# Patient Record
Sex: FEMALE | Race: WHITE | Employment: FULL TIME | URBAN - METROPOLITAN AREA
[De-identification: names, ages, dates, MRNs, and addresses within clinical notes are randomized per-mention and may not be internally consistent; named-entity substitution may affect disease eponyms.]

---

## 2017-09-26 NOTE — PROGRESS NOTES
HPI:   Toña Renteria is a 33year old female who presents for a complete physical exam.   Patient's last menstrual period was 2017 (approximate). Last pap 2 years ago and nl. Child almost 2 years ago - . No DM or HTN. Normal menses.  No bir clear  EYES:PERRLA, EOMI,,conjunctiva are clear  NECK: supple,no adenopathy,no bruits  CHEST: no chest tenderness  BREAST: no dominant or suspicious mass, nontender  LUNGS: clear to auscultation  CARDIO: RRR without murmur  GI: good BS's,no masses, HSM or

## 2019-02-06 NOTE — TELEPHONE ENCOUNTER
Pt requesting RX for Plan B- stated her INS will cover it and it will not cost her, she stated she know it's also OTC-  LOV 2017

## 2019-02-06 NOTE — TELEPHONE ENCOUNTER
I sent it. Please transfer her to call center to schedule physical. Need to see her yearly in order to be prescribing medications.

## 2019-02-06 NOTE — TELEPHONE ENCOUNTER
Pt was called and inform of  message below. Pt verbalized understanding and was transferred to set up a Px appt.

## 2019-03-19 NOTE — PROGRESS NOTES
HPI:   Jo Madrigal is a 33year old female who presents for a complete physical exam.    Here for regular physical. Reports no concerns with her health. Has been exercising. Regular menses monthly.    Stopped the birth controlled   Wt Readings from L BS's,no masses, HSM or tenderness  MUSCULOSKELETAL: back is not tender,FROM of the back  EXTREMITIES: no cyanosis, clubbing or edema  NEURO: Oriented times three,cranial nerves are intact,motor and sensory are grossly intact    ASSESSMENT AND PLAN:   Addie Clayton

## 2019-12-12 NOTE — TELEPHONE ENCOUNTER
Pt had a positive preg test at home. LMP 11-6-19. Pt agrees to see all MDs. Pt will start a pnv with dha. Pt to call if any problems or spotting/bleeding. Pt given the date, time and location for the appt for obn.   Pt states that her periods are regul

## 2019-12-26 NOTE — PROGRESS NOTES
Pt seen for OBN appt today with no complaints. Normal PN labs and HCV ordered. Pt advised all labs must be completed and resulted prior to MD appt.   Assisted pt with scheduling NPN appt with ANNA on 1/11/20 at 830am.    Advised pt to obtain C-Sx operative Neural tube defect (Meningomyelocele, Spina bifida, or Anencephaly):  No   Congenital heart defect:  No   Down syndrome:  No   Avery-Sachs (Ashkenazi Zoroastrian, [facility], [place]):  No   Canavan disease (Ashkenazi Zoroastrian):  No   Familial dysautonomia (As

## 2020-01-11 PROBLEM — Z98.891 HISTORY OF C-SECTION: Status: ACTIVE | Noted: 2020-01-11

## 2020-01-11 NOTE — PROGRESS NOTES
Here with . Pap/Gc/chlamydia/trichomonas. Considering FTS. Reviewed NPN labs. Pt having increased discharge with odor. Rx Flagyl since urine culture-- gardnerella . Uncertain if she already got flu shot. Bedside u/s--+FHT, c/w dates.   RTC

## 2020-01-25 NOTE — TELEPHONE ENCOUNTER
11w3d states Finished abx for foul smell vaginal d/c and started having vaginal itching and burning. Denies pain or burning with urination. Denies frequent urination. Denies backache or fevers. Denies spotting or bleeding.  Advised ok to try Monistat #3 or

## 2020-01-25 NOTE — TELEPHONE ENCOUNTER
LEFT MESSAGE TO USE 7 DAY MONISTAT RATHER THAN 3 DAY. ENCOURAGED TO CALL BACK WITH ANY QUESTIONS OR CONCERNS.

## 2020-02-06 NOTE — PROGRESS NOTES
[facility] at visit. They have Op Note but forgot it at home. I discussed and gave  packet for their review. They never did call back for FTS but are interested. We'll see if possible by .

## 2020-02-06 NOTE — TELEPHONE ENCOUNTER
It is very late but couple still interested in Devyn Jernigan. I saw them last evening and see if we can refer them by 13 6/7. I told them it may not be possible. Thank you.

## 2020-02-12 NOTE — PROGRESS NOTES
OB ULTRASOUND REPORT   See imaging tab for complete ultrasound report     Fetal Heart Rate: Present 150 bpm  Fetal Presentation: Transverse Left  Amniotic fluid MVP: WNL  Placental Location: Anterior      FIRST TRIMESTER SCREENING:    The CRL is cons

## 2020-02-13 NOTE — TELEPHONE ENCOUNTER
Recd FTS results and Dr Umaña Ahr  reviewed and signed off  Spoke with Kana Whitfield and informed her that the probability for Trisomy 13,18  after screening is 1 in > 10,000 and for Trisomy 21 1 in > 10,000.   These results within range  Pt verbalized Reading

## 2020-03-05 NOTE — PROGRESS NOTES
DARIANA asked for a copy of pt's op report dated 10-27-15 that pt brought into the office. Placed on NJ's desk.

## 2020-03-05 NOTE — PROGRESS NOTES
Flu vaccine given in left deltoid. Pt tolerated well. Pt signed consent and given information sheet.       LOT  [id number]  EXP  06-30-20  . [facility]  [phone]

## 2020-03-10 NOTE — PROGRESS NOTES
Wishes for Flu shot. Documented primary LSTCSx via care everywhere. Needs to review  consent.  RTC 4 wks

## 2020-03-23 NOTE — TELEPHONE ENCOUNTER
19w5d Pt requesting letter stating she needs to work from home for employer. Informed pt our MDs are following recommendations from ACOG and CDC. Informed pt we can provide generic letter regarding Covid-19.  Pt requesting message to be sent to CAP (on-call

## 2020-03-23 NOTE — TELEPHONE ENCOUNTER
Pt is 19w5d and looking for some feedback in regards to [address], per pt is still required to work.  Please advise

## 2020-03-24 NOTE — TELEPHONE ENCOUNTER
Generic letter is ok with me. Agree with triage that there are no current recommendations mandating that pregnant patients stay home from work.

## 2020-03-26 PROBLEM — Z13.79 GENETIC SCREENING: Status: ACTIVE | Noted: 2020-03-26

## 2020-04-01 NOTE — TELEPHONE ENCOUNTER
Pt states she thought her appt on 4/4/ was going to be the appt that she found out the gender. Pt informed that the gender can be found out at her 23 wk US if the baby is positioned properly. Pt states she was never given an order for the 20 wk US.   Her

## 2020-04-01 NOTE — TELEPHONE ENCOUNTER
Pt was rescheduled frm 4-4-20 to 4-17-20 PC with Dr Kenneth Montero per Dr Kenneth Montero    w-like to get an order to find out sex of baby?     Please advise

## 2020-04-17 PROBLEM — Z34.90 NORMAL OBSTETRIC ULTRASOUND SCAN, ANTEPARTUM: Status: ACTIVE | Noted: 2020-04-17

## 2020-04-17 PROBLEM — Z34.90 NORMAL OBSTETRIC ULTRASOUND SCAN, ANTEPARTUM (HCC): Status: ACTIVE | Noted: 2020-04-17

## 2020-05-13 NOTE — TELEPHONE ENCOUNTER
Patient would like to be seen by MATEUS at [place] on 5/21 in the afternoon. CAP is at [place] that afternoon. Please reschedule with her for this as I will no longer be seeing OB pt in the office on 5/21 in afternoon.

## 2020-07-24 NOTE — TELEPHONE ENCOUNTER
Pt requesting letter stating she is pregnant and her RACHELE. Informed pt letter will be sent via [address] Pt verbalized understanding.

## 2020-07-29 NOTE — TELEPHONE ENCOUNTER
Spoke to pt. Advised repeat section scheduled on Wed,8/5/20 at 7:30am. Verbalized understandings. Pt advised pre-op labs to be done in [facility] system. Instructions FBC info sheet and antimicrobial wash instructions sent via [employer].     Will call A

## 2020-07-29 NOTE — TELEPHONE ENCOUNTER
Please schedule the following surgery:    Procedure: Repeat  section    Date: 39 weeks --  am    Diagnosis: Previous  section, Breech    Admission: AM Admit    Anesth: Spinal

## 2020-07-31 NOTE — TELEPHONE ENCOUNTER
Pt states she is scheduled for her  on . She has the covid test scheduled but was unsure when to do the blood work. Pt instructed to have blood work done 2 days prior to surgery. Pt also states [facility] told her she can have a tour of [address] [place] [address].   Pt as

## 2020-08-03 ENCOUNTER — TELEPHONE (OUTPATIENT)
Dept: OBGYN UNIT | Facility: HOSPITAL | Age: 30
End: 2020-08-03

## 2020-08-03 NOTE — TELEPHONE ENCOUNTER
PT DROPPED OFF FORMS TO BE FILLED OUT FOR FMLA. PT FILLED OUT HIPAA AND PAID $25 FEE IN FULL ON 8.3.2020 AT [facility] [place].

## 2020-08-03 NOTE — TELEPHONE ENCOUNTER
38w5d Pt asking if she needs to fast prior to labs. Advised pt she does not need to fast and they need to be done 72 hrs prior to scheduled c-sx. Pt verbalized understanding.

## 2020-08-04 NOTE — TELEPHONE ENCOUNTER
Dr. Rakan Mcmillan,     Please sign off on form: FMLA - maternity leave   -Highlight the patient and hit \"Chart\" button. -In Chart Review, w/in the Encounter tab - click 1 time on the Telephone call encounter for 8/3/2020 Scroll down the telephone encounter.

## 2020-08-04 NOTE — H&P
[facility] [facility] [address] Patient Status:  Surgery Admit - Inpt    1990 MRN [id number]   Location [address], [address] [facility] Day # 0 PCP Aassoheila 46 Meds: prenatal multivitamin plus DHA 27-0.8-228 MG Oral Cap, Take 1 capsule by mouth daily. , Disp: , Rfl: , 8/4/2020 at 2100  MECLIZINE HCL OR, Take by mouth as needed. , Disp: , Rfl: , More than a month at Unknown time      Allergies: No Known Allergies 0.00 - 0.70 x10(3) uL    Basophil Absolute 0.08 0.00 - 0.20 x10(3) uL    Immature Granulocyte Absolute 0.19 0.00 - 1.00 x10(3) uL    Neutrophil % 65.2 %    Lymphocyte % 21.4 %    Monocyte % 8.9 %    Eosinophil % 1.6 %    Basophil % 0.9 %    Immature Granul

## 2020-08-05 NOTE — PROGRESS NOTES
Patient transferred to mother/baby room 355   per cart in stable condition with baby and personal belongings. Accompanied by  and staff. Report given to mother/baby RN.

## 2020-08-05 NOTE — PROGRESS NOTES
Tasneem-care done and patient sitting in the chair with minimal complaints. Patient advanced to a full liquid diet tray. All questions answered.

## 2020-08-05 NOTE — OPERATIVE REPORT
Operative Report for  Section:    Date: 20  Patient: Kevin Morrow  : 1990  MRN: [id number]    Preoperative Diagnosis: Intrauterine Pregnancy 39w0d, previous CS, Breech presentation  Postoperative Diagnosis: same  Procedure(s): rep in the midline and the incision extended laterally with the dash scissors. The superior aspect of the fascial incision was then grasped with Kocher clamps, elevated, and the underlying rectus muscles dissected off.   Attention was then turned to the inferi procedure well. Sponge, lap, and needle counts were correct. Two grams of Ancef were given prior to skin incision. The patient was taken to the recovery room in stable condition.     Eagle Estes, 08/05/20, 9:02 AM

## 2020-08-05 NOTE — ANESTHESIA PROCEDURE NOTES
Spinal Block  Date/Time: 8/5/2020 7:45 AM  Performed by: Whit Patterson MD  Authorized by: Whit Patterson MD       General Information and Staff    Start Time:  8/5/2020 7:39 AM  End Time:  8/5/2020 7:46 AM  Anesthesiologist:  Analisa Craig

## 2020-08-05 NOTE — DISCHARGE SUMMARY
[facility]    Discharge Summary    Karlo Estrada Patient Status:  Inpatient    1990 MRN [id number]   Location [address] [facility] Attending Lashon Mike, [address] [facility] Day # 0       Delivering OB Clinician:

## 2020-08-05 NOTE — ANESTHESIA PREPROCEDURE EVALUATION
Anesthesia PreOp Note    HPI:     Jossy Shook is a 30year old female who presents for preoperative consultation requested by: Sandeep Fan DO    Date of Surgery: 2020    Procedure(s):   SECTION  Indication: benjie 20  repeat c-se History    Socioeconomic History      Marital status: Single      Spouse name: Not on file      Number of children: Not on file      Years of education: Not on file      Highest education level: Not on file    Occupational History      Not on file    Joanna BP: 113/72   Pulse: 72   Resp: 16   Temp: 97.6 °F (36.4 °C)   TempSrc: Oral        Anesthesia Evaluation     Patient summary reviewed    Airway   Mallampati: II  TM distance: >3 FB  Dental - normal exam     Pulmonary - negative ROS and normal exam   Card

## 2020-08-05 NOTE — PROGRESS NOTES
Pt is a 30year old female admitted to TR5/TR5-A. No chief complaint on file. Pt is  39w0d intra-uterine pregnancy. History obtained, consents signed. Oriented to room, staff, and plan of care.

## 2020-08-05 NOTE — PROGRESS NOTES
Received patient from L&D via wheelchair. ID bands verified and unit orientation discussed and plan of care agreed on. Vitals are stable and bleeding WNL. Patient is breastfeeding and bottle feeding. All questions answered. Family at the bedside.

## 2020-08-05 NOTE — ANESTHESIA POSTPROCEDURE EVALUATION
Patient: Keri Alejo    Procedure Summary     Date:  20 Room / Location:  [facility] L+D OR  [facility] L+D OR    Anesthesia Start:  8751 Anesthesia Stop:      Procedure:   SECTION (N/A ) Diagnosis:  (Repeat Cesearen Section)    Surgeon:  Tigist Varghese

## 2020-08-06 NOTE — PROGRESS NOTES
[facility]    OB/Gyne Post  Section Progress Note      Fidelia Yomahendra Patient Status:  Inpatient    1990 MRN [id number]   Location [facility] 3SE Attending Sarah Daily, [address] [facility] Day # 1 PCP Yuridia Ruvalcaba found.      Assessment/Plan   POD# 1 with following issues: Patient Active Problem List:     History of      Genetic screening     Normal obstetric ultrasound scan, antepartum     Breech presentation  .     CPM, ambulate      Donavan Alcaraz MD  8/

## 2020-08-06 NOTE — LACTATION NOTE
LACTATION NOTE - MOTHER      Evaluation Type: Inpatient    Problems identified  Problems identified: Knowledge deficit  Milk supply not WNL: Reduced (potential)  Problems Identified Other: supplementing with formula    Maternal history  Maternal history: C

## 2020-08-06 NOTE — LACTATION NOTE
This note was copied from a baby's chart. LACTATION NOTE - INFANT    Evaluation Type  Evaluation Type: Inpatient    Problems & Assessment  Problems Diagnosed or Identified: Sleepy; Latch difficulty  Problems: comment/detail: breech  Infant Assessment: Skin

## 2020-08-07 NOTE — PROGRESS NOTES
[facility]    Post  Progress Note      Bairon Bettencourt Patient Status:  Inpatient    1990 MRN [id number]   Location [facility] 3SE Attending Partha Go, DO   Hosp Day # 2 PCP Percy CHIANG       Subjective

## 2020-08-08 NOTE — TELEPHONE ENCOUNTER
Pt had C-sec on 8/5 and reports worsening of rash. States rash started the day after her C-sec and has gotten worse this morning. States rash is red with bumps and itching to inner thighs, abdomen and hips. Denies signs of infection to incision.  Also denie

## 2020-08-08 NOTE — TELEPHONE ENCOUNTER
Patient has a rash on stomach,theighs, and inner hip area, patient believes it may be an allergic reaction to what was given to her during , please call at:[phone],thanks.

## 2020-08-10 NOTE — TELEPHONE ENCOUNTER
Pt. states that rash has spread all over her body, and that the Benadryl has not helped with her symptoms.

## 2020-08-10 NOTE — TELEPHONE ENCOUNTER
Informed pt of CHANOK recs below. Offered pt first available appt with NIKHIL on 8/11/2020 in [facility] and pt declined. Assisted pt with scheduling appt with ANNA at [facility] on 8/11/2020 at 420pm. Pt verbalized understanding.

## 2020-08-10 NOTE — TELEPHONE ENCOUNTER
C-SX ON 8-5-20. C/O RASH SPREAD OVER ARMS YESTERDAY SO IS NOW ALL OVER HER BODY. HAS USED HYDROCORTISONE/BENADRYL CREAM 4-5/DAY AND HAS TAKEN ORAL BENADRYL 4 TIMES SINCE Saturday. SHE CALLED PCP AND WAS TRANSFERRED BACK TO US. PT IS BREASTFEEDING.

## 2020-08-12 NOTE — PROGRESS NOTES
Zoya Ibrahim is a 30year old female [id number] Patient's last menstrual period was 2019 (exact date). Patient presents with:  Prenatal Care: 1 WK PP C/O RASH ALL OVER BODY    Had repeat  on 2020 with LIZY. Went home on POD #2.   At time kg/m²   Body mass index is 29.04 kg/m². Constitutional: well developed, well nourished  Abdomen-- soft and nontender. Incision intact without any erythema or rash.    Skin-- raised scattered rash on bilateral lower extremities and now bilateral arms

## 2020-09-18 PROBLEM — Z34.90 NORMAL OBSTETRIC ULTRASOUND SCAN, ANTEPARTUM: Status: RESOLVED | Noted: 2020-04-17 | Resolved: 2020-09-18

## 2020-09-18 PROBLEM — Z34.90 NORMAL OBSTETRIC ULTRASOUND SCAN, ANTEPARTUM (HCC): Status: RESOLVED | Noted: 2020-04-17 | Resolved: 2020-09-18

## 2020-09-18 PROBLEM — Z13.79 GENETIC SCREENING: Status: RESOLVED | Noted: 2020-03-26 | Resolved: 2020-09-18

## 2020-09-18 PROBLEM — Z98.891 HISTORY OF C-SECTION: Status: RESOLVED | Noted: 2020-01-11 | Resolved: 2020-09-18

## 2020-09-18 NOTE — H&P
MENG    Luis Alexandre is a 30year old female  here for 6 week post-partum visit. Patient delivered a  female infant on 20 via repeat CSx. Patient desires condoms for contraception. Patient is breast feeding.    Patient denies symptoms of de multivitamin plus DHA 27-0.8-228 MG Oral Cap, Take 1 capsule by mouth daily. , Disp: , Rfl:     ALLERGIES:  No Known Allergies    PHYSICAL EXAM  Blood pressure 119/85, pulse 72, weight 143 lb (64.9 kg), last menstrual period 11/06/2019, currently breastfeed

## 2021-01-15 NOTE — H&P
HPI:  The patient is a 36 yo F here for WWE. NO gyn complaints. Monthly cycles with 4 days light to mod flow. Monogamous. Uses condoms. Wants to discuss [facility] options.       LPS:1/11/20 pap neg    Reviewed medical and surgical history below       OBSTET on file        Emotionally abused: Not on file        Physically abused: Not on file        Forced sexual activity: Not on file    Other Topics      Concerns:        Not on file    Social History Narrative      Not on file      FAMILY HISTORY:  No family h contour, position, mobility, without tenderness  Adnexa: normal without masses or tenderness  Perineum: normal    Assessment/Plan:  Marilu Morris was seen today for gyn exam.    Diagnoses and all orders for this visit:    Well woman exam    Birth control couns

## 2023-09-27 ENCOUNTER — OFFICE VISIT (OUTPATIENT)
Dept: FAMILY MEDICINE CLINIC | Facility: CLINIC | Age: 33
End: 2023-09-27

## 2023-09-27 ENCOUNTER — LAB ENCOUNTER (OUTPATIENT)
Dept: LAB | Age: 33
End: 2023-09-27
Attending: NURSE PRACTITIONER
Payer: COMMERCIAL

## 2023-09-27 VITALS
BODY MASS INDEX: 27.88 KG/M2 | WEIGHT: 142 LBS | SYSTOLIC BLOOD PRESSURE: 108 MMHG | HEART RATE: 62 BPM | DIASTOLIC BLOOD PRESSURE: 72 MMHG | HEIGHT: 60 IN

## 2023-09-27 DIAGNOSIS — L30.9 DERMATITIS: Primary | ICD-10-CM

## 2023-09-27 DIAGNOSIS — L30.9 DERMATITIS: ICD-10-CM

## 2023-09-27 PROCEDURE — 86003 ALLG SPEC IGE CRUDE XTRC EA: CPT

## 2023-09-27 PROCEDURE — 36415 COLL VENOUS BLD VENIPUNCTURE: CPT

## 2023-09-27 PROCEDURE — 82785 ASSAY OF IGE: CPT

## 2023-09-27 PROCEDURE — 3074F SYST BP LT 130 MM HG: CPT | Performed by: NURSE PRACTITIONER

## 2023-09-27 PROCEDURE — 3078F DIAST BP <80 MM HG: CPT | Performed by: NURSE PRACTITIONER

## 2023-09-27 PROCEDURE — 99213 OFFICE O/P EST LOW 20 MIN: CPT | Performed by: NURSE PRACTITIONER

## 2023-09-27 PROCEDURE — 3008F BODY MASS INDEX DOCD: CPT | Performed by: NURSE PRACTITIONER

## 2023-09-27 RX ORDER — CLOBETASOL PROPIONATE 0.5 MG/G
1 OINTMENT TOPICAL 2 TIMES DAILY PRN
Qty: 45 G | Refills: 1 | Status: SHIPPED | OUTPATIENT
Start: 2023-09-27

## 2023-09-28 LAB
A ALTERNATA IGE QN: <0.1 KUA/L (ref ?–0.1)
C HERBARUM IGE QN: <0.1 KUA/L (ref ?–0.1)
CAT DANDER IGE QN: 1.51 KUA/L (ref ?–0.1)
CLAM IGE QN: <0.1 KUA/L (ref ?–0.1)
CODFISH IGE QN: <0.1 KUA/L (ref ?–0.1)
COMMON RAGWEED IGE QN: 3.99 KUA/L (ref ?–0.1)
CORN IGE QN: <0.1 KUA/L (ref ?–0.1)
COW MILK IGE QN: <0.1 KUA/L (ref ?–0.1)
D FARINAE IGE QN: <0.1 KUA/L (ref ?–0.1)
DOG DANDER IGE QN: 7.49 KUA/L (ref ?–0.1)
EGG WHITE IGE QN: <0.1 KUA/L (ref ?–0.1)
GOOSEFOOT IGE QN: 0.16 KUA/L (ref ?–0.1)
HOUSE DUST HS IGE QN: 0.4 KUA/L (ref ?–0.1)
IGE SERPL-ACNC: 57.3 KU/L (ref 2–214)
IGE SERPL-ACNC: 70.8 KU/L (ref 2–214)
KENT BLUE GRASS IGE QN: 0.1 KUA/L (ref ?–0.1)
PEANUT IGE QN: <0.1 KUA/L (ref ?–0.1)
PER RYE GRASS IGE QN: <0.1 KUA/L (ref ?–0.1)
SCALLOP IGE QN: <0.1 KUA/L (ref ?–0.1)
SESAME SEED IGE QN: <0.1 KUA/L (ref ?–0.1)
SHRIMP IGE QN: <0.1 KUA/L (ref ?–0.1)
SOYBEAN IGE QN: <0.1 KUA/L (ref ?–0.1)
WALNUT IGE QN: <0.1 KUA/L (ref ?–0.1)
WHEAT IGE QN: <0.1 KUA/L (ref ?–0.1)
WHITE ELM IGE QN: 0.18 KUA/L (ref ?–0.1)
WHITE OAK IGE QN: 0.91 KUA/L (ref ?–0.1)

## 2023-09-29 ENCOUNTER — TELEPHONE (OUTPATIENT)
Dept: FAMILY MEDICINE CLINIC | Facility: CLINIC | Age: 33
End: 2023-09-29

## 2023-09-29 DIAGNOSIS — T78.40XA ALLERGY, INITIAL ENCOUNTER: Primary | ICD-10-CM

## 2023-09-29 DIAGNOSIS — L30.9 DERMATITIS: ICD-10-CM

## 2023-09-29 NOTE — TELEPHONE ENCOUNTER
Called Kina, verified patient's Name and . Confirmed that prescription was received, however, it is under the patient's profile showing her maiden name and not her   name Cleopatra. Spoke to patient, verified patient's Name and . Relayed information above. She states she told them both last names when she tried to  the medication. She will bring her insurance card and will have her name updated at the pharmacy when she picks up the medication. Patient verbalized understanding and had no further questions at this time.

## 2023-09-29 NOTE — TELEPHONE ENCOUNTER
Patient called and said prescription was not sent to pharmacy.        Medication Quantity Refills Start End   clobetasol 0.05 % External Ointment

## 2023-10-26 ENCOUNTER — OFFICE VISIT (OUTPATIENT)
Dept: DERMATOLOGY CLINIC | Facility: CLINIC | Age: 33
End: 2023-10-26

## 2023-10-26 DIAGNOSIS — L30.9 DERMATITIS: Primary | ICD-10-CM

## 2023-10-26 PROCEDURE — 99203 OFFICE O/P NEW LOW 30 MIN: CPT | Performed by: PHYSICIAN ASSISTANT

## 2023-10-26 NOTE — PROGRESS NOTES
HPI:    Patient ID: Manny Lopez is a 29year old female. Patient presents for rash of concern on the right lower leg for the past 1 year and newly on the anterior neck. Has been wearing sweaters lately as well. Did have allergy tesitng done, which showed allergies, to cats, dogs, and ragweed. No draining or tenderness noted. Some itching noted. Has noted more sensitivity to things after her 2nd pregnancy. Has been using triamcinolone and clobetasol with some relief. Rash on the shin is mostly gone. Denies changes to products or any changes to the enviornement lately. No allergies to medications noted. Patient does have a dog at home. Review of Systems   Constitutional:  Negative for chills and fever. Musculoskeletal:  Negative for arthralgias and myalgias. Skin:  Positive for rash. Negative for color change and wound. Current Outpatient Medications   Medication Sig Dispense Refill    clobetasol 0.05 % External Ointment Apply 1 Application topically 2 (two) times daily as needed. 45 g 1    MECLIZINE HCL OR Take by mouth.      triamcinolone 0.1 % External Ointment Apply 1 Application topically 2 (two) times daily. Apply to affected area twice a day 30 g 2     Allergies:No Known Allergies   LMP 05/23/2023 (Exact Date)   There is no height or weight on file to calculate BMI. PHYSICAL EXAM:   Physical Exam  Constitutional:       General: She is not in acute distress. Appearance: Normal appearance. Skin:     General: Skin is warm and dry. Findings: Rash present. Comments: Eczematous rash noted on the anterior neck. No draining or tenderness noted. Slight scaling ntoed. Hyperpigmentation noted on the lower right leg. No draining or tenderness noted. Neurological:      Mental Status: She is alert and oriented to person, place, and time. ASSESSMENT/PLAN:   1.  Dermatitis  -After discussion with patient, advised the following:  -Suspect allergies are contributing to the rash. -Continue clobetasol the neck 2 times per day   -Continue clobetasol to the lower right leg once per day  -Should improve in the next few weeks   -To call or follow-up with worsening symptoms or concerns.   -Pt was agreeable to plan and will comply with discussion above. No orders of the defined types were placed in this encounter.       Meds This Visit:  Requested Prescriptions      No prescriptions requested or ordered in this encounter       Imaging & Referrals:  None         FB#[id number]

## 2023-11-30 ENCOUNTER — OFFICE VISIT (OUTPATIENT)
Dept: FAMILY MEDICINE CLINIC | Facility: CLINIC | Age: 33
End: 2023-11-30

## 2023-11-30 VITALS
HEART RATE: 63 BPM | WEIGHT: 144 LBS | SYSTOLIC BLOOD PRESSURE: 112 MMHG | BODY MASS INDEX: 28 KG/M2 | DIASTOLIC BLOOD PRESSURE: 78 MMHG

## 2023-11-30 DIAGNOSIS — M54.50 ACUTE BILATERAL LOW BACK PAIN WITHOUT SCIATICA: Primary | ICD-10-CM

## 2023-11-30 PROCEDURE — 99213 OFFICE O/P EST LOW 20 MIN: CPT | Performed by: FAMILY MEDICINE

## 2023-11-30 PROCEDURE — 3078F DIAST BP <80 MM HG: CPT | Performed by: FAMILY MEDICINE

## 2023-11-30 PROCEDURE — 3074F SYST BP LT 130 MM HG: CPT | Performed by: FAMILY MEDICINE

## 2024-03-20 ENCOUNTER — OFFICE VISIT (OUTPATIENT)
Dept: OBGYN CLINIC | Facility: CLINIC | Age: 34
End: 2024-03-20

## 2024-03-20 VITALS
BODY MASS INDEX: 28.27 KG/M2 | HEIGHT: 60 IN | SYSTOLIC BLOOD PRESSURE: 107 MMHG | WEIGHT: 144 LBS | HEART RATE: 76 BPM | DIASTOLIC BLOOD PRESSURE: 72 MMHG

## 2024-03-20 DIAGNOSIS — Z12.4 SCREENING FOR CERVICAL CANCER: ICD-10-CM

## 2024-03-20 DIAGNOSIS — Z01.419 WELL WOMAN EXAM WITH ROUTINE GYNECOLOGICAL EXAM: Primary | ICD-10-CM

## 2024-03-20 PROCEDURE — 99395 PREV VISIT EST AGE 18-39: CPT | Performed by: NURSE PRACTITIONER

## 2024-03-20 PROCEDURE — 3008F BODY MASS INDEX DOCD: CPT | Performed by: NURSE PRACTITIONER

## 2024-03-20 PROCEDURE — 3078F DIAST BP <80 MM HG: CPT | Performed by: NURSE PRACTITIONER

## 2024-03-20 PROCEDURE — 3074F SYST BP LT 130 MM HG: CPT | Performed by: NURSE PRACTITIONER

## 2024-03-20 NOTE — PROGRESS NOTES
[facility]    Obstetrics and Gynecology    Chief Complaint   Patient presents with    Gyn Exam     ANNUAL EXAM       Josefa Whelan is a 33 year old female  Patient's last menstrual period was 03/10/2024 (approximate). presenting for annual gynecology exam. Feeling well, no concerns today.  Regular cycles every month, lasts 5 days, normal to heavy flow day 1, no concerns with pain, some bloating with period.  She is sexually active with , no concerns with intercourse. Not using contraception, not trying for pregnancy but would be okay if conceived.    Moderate exercise.    Pap:2020 NILM   Contraception:none  Mammo: n/a  Colonoscopy: n/a    OBSTETRICS HISTORY:  OB History    Para Term  AB Living   2 2 2 0 0 2   SAB IAB Ectopic Multiple Live Births   0 0 0 0 2       GYNE HISTORY:  Menarche: 11/13 YEARS OLD (3/20/2024 10:36 AM)  Period Cycle (Days): monthly (3/20/2024 10:36 AM)  Period Duration (Days): 4 TO 5 DAYS (3/20/2024 10:36 AM)  Period Flow: HEAVY TO MODERATE (3/20/2024 10:36 AM)  Use of Birth Control (if yes, specify type): None (3/20/2024 10:36 AM)  Pap Date: 20 (3/20/2024 10:36 AM)  Pap Result Notes: NEG (3/20/2024 10:36 AM)  Follow Up Recommendation: annual 20 JLK (3/20/2024 10:36 AM)      History   Sexual Activity    Sexual activity: Not on file           Latest Ref Rng & Units 2020     9:23 AM 2017     5:10 PM   RECENT PAP RESULTS   Thinprep Pap Negative for intraepithelial lesion or malignancy Negative for intraepithelial lesion or malignancy  Negative for intraepithelial lesion or malignancy          MEDICAL HISTORY:  Past Medical History:   Diagnosis Date    Visual impairment     glasses     Past Surgical History:   Procedure Laterality Date      10/27/2015    last in        SOCIAL HISTORY:  Social History     Socioeconomic History    Marital status:      Spouse name: Not on file    Number of children: Not on file     Years of education: Not on file    Highest education level: Not on file   Occupational History    Not on file   Tobacco Use    Smoking status: Never    Smokeless tobacco: Never   Vaping Use    Vaping Use: Never used   Substance and Sexual Activity    Alcohol use: Yes     Comment: socially     Drug use: No    Sexual activity: Not on file   Other Topics Concern    Grew up on a farm Not Asked    History of tanning Not Asked    Outdoor occupation Not Asked    Breast feeding Not Asked    Reaction to local anesthetic No    Pt has a pacemaker Not Asked    Pt has a defibrillator Not Asked   Social History Narrative    Not on file     Social Determinants of Health     Financial Resource Strain: Not on file   Food Insecurity: Not on file   Transportation Needs: Not on file   Physical Activity: Not on file   Stress: Not on file   Social Connections: Not on file   Housing Stability: Not on file         Depression Screening (PHQ-2/PHQ-9): Over the LAST 2 WEEKS   Little interest or pleasure in doing things: Not at all    Feeling down, depressed, or hopeless: Not at all    PHQ-2 SCORE: 0           FAMILY HISTORY:  History reviewed. No pertinent family history.    MEDICATIONS:    Current Outpatient Medications:     diclofenac 50 MG Oral Tab EC, Take 1 tablet (50 mg total) by mouth 2 (two) times daily as needed (take it with food)., Disp: 30 tablet, Rfl: 0    ALLERGIES:  No Known Allergies      Review of Systems:  Constitutional:  Denies fatigue, night sweats, hot flashes  Eyes:  denies blurred or double vision  Cardiovascular:  denies chest pain or palpitations  Respiratory:  denies shortness of breath  Gastrointestinal:  denies heartburn, abdominal pain, diarrhea or constipation  Genitourinary:  denies dysuria, incontinence, abnormal vaginal discharge, vaginal itching,   Musculoskeletal:  denies back pain   Skin/Breast:  Denies any breast pain, lumps, or discharge.   Neurological:  denies headaches, extremity weakness or  numbness.  Psychiatric: denies depression or anxiety.  Endocrine:   denies excessive thirst or urination.  Heme/Lymph:  denies history of anemia, easy bruising or bleeding.      PHYSICAL EXAM:     Vitals:    03/20/24 1036   BP: 107/72   Pulse: 76   Weight: 144 lb (65.3 kg)   Height: 5' (1.524 m)       Body mass index is 28.12 kg/m².     Constitutional: well developed, well nourished  Psychiatric:  Oriented to time, place, person and situation. Appropriate mood and affect  Head/Face: normocephalic  Neck/Thyroid: thyroid symmetric, no thyromegaly, no nodules, no adenopathy  Lymphatic:no abnormal supraclavicular or axillary adenopathy is noted  Breast: normal without palpable masses, tenderness, asymmetry, nipple discharge, nipple retraction or skin changes  Abdomen:  soft, nontender, nondistended, no masses  Skin/Hair: no unusual rashes or bruises  Extremities: no edema, no cyanosis    Pelvic Exam:  External Genitalia: normal appearance, hair distribution, and no lesions  Urethral Meatus:  normal in size, location, without lesions and prolapse  Bladder:  No fullness, masses or tenderness  Vagina:  Normal appearance without lesions, no abnormal discharge  Cervix:  Normal without tenderness on motion  Uterus: normal in size, contour, position, mobility, without tenderness  Adnexa: normal without masses or tenderness  Perineum: normal  Anus: no hemorroids     Assessment & Plan:    ICD-10-CM    1. Well woman exam with routine gynecological exam  Z01.419       2. Screening for cervical cancer  Z12.4 ThinPrep PAP Smear     Hpv Dna  High Risk , Thin Prep Collect     ThinPrep PAP Smear     Hpv Dna  High Risk , Thin Prep Collect     CANCELED: ThinPrep PAP with HPV Reflex Request B         Reviewed ASCCP guidelines with the patient   Pap with HPV today  Contraception: Using none, declines  Breast Health:     Reviewed current guidelines with the patient and to start Mammograms at age 40  Reviewed monthly self breast exams with  the patient   Discussed diet, exercise, MVIs with Ca/Vit D  Follow up in 1 yr for JU Santiago    This note was prepared using Dragon Medical voice recognition dictation software. As a result errors may occur. When identified these errors have been corrected. While every attempt is made to correct errors during dictation discrepancies may still exist.     [FreeTextEntry2] : PO #2: LT Hip

## 2024-03-21 LAB — HPV I/H RISK 1 DNA SPEC QL NAA+PROBE: NEGATIVE

## 2024-11-01 NOTE — LETTER
VACCINE ADMINISTRATION RECORD  PARENT / GUARDIAN APPROVAL  Date: 2020  Vaccine administered to: Jo Madrigal     : 1990    MRN: [id number]    A copy of the appropriate Centers for Disease Control and Prevention Vaccine Information statement 50 year old male with a history of IDDM, HTN p/w left foot infection. Patient is poorly compliant with medication and treatment.  He was admitted 9/17-10/4 for infected foot ulcer, had a left first toe amputation on 9/20 and has not followed up with podiatry since.  He noted an infection with bad odor today.  On exam, deep space wound infection with erythema, odor, and wound dehiscence.  Check labs, lactate, X-ray, CT foot, abx, consult podiatry, admit for Abx and possible further resection 50 year old male with a history of IDDM, alcohol abuse p/w left foot infection. Patient is poorly compliant with medication and treatment.  He was admitted 9/17-10/4 for infected foot ulcer, had a left first toe amputation on 9/20 and has not followed up with podiatry since.  He noted an infection with bad odor today.  On exam, deep space wound infection with erythema, odor, and wound dehiscence.  Check labs, lactate, X-ray, CT foot, abx, consult podiatry, admit for Abx and possible further resection

## 2025-04-11 ENCOUNTER — OFFICE VISIT (OUTPATIENT)
Dept: FAMILY MEDICINE CLINIC | Facility: CLINIC | Age: 35
End: 2025-04-11

## 2025-04-11 ENCOUNTER — NURSE TRIAGE (OUTPATIENT)
Dept: FAMILY MEDICINE CLINIC | Facility: CLINIC | Age: 35
End: 2025-04-11

## 2025-04-11 VITALS
BODY MASS INDEX: 28.59 KG/M2 | OXYGEN SATURATION: 98 % | DIASTOLIC BLOOD PRESSURE: 71 MMHG | HEIGHT: 60 IN | HEART RATE: 66 BPM | SYSTOLIC BLOOD PRESSURE: 102 MMHG | WEIGHT: 145.63 LBS

## 2025-04-11 DIAGNOSIS — R59.0 SUBMANDIBULAR LYMPHADENOPATHY: Primary | ICD-10-CM

## 2025-04-11 DIAGNOSIS — K13.1 BITING OF ORAL MUCOSA: ICD-10-CM

## 2025-04-11 PROCEDURE — 3078F DIAST BP <80 MM HG: CPT

## 2025-04-11 PROCEDURE — 99213 OFFICE O/P EST LOW 20 MIN: CPT

## 2025-04-11 PROCEDURE — 3008F BODY MASS INDEX DOCD: CPT

## 2025-04-11 PROCEDURE — 3074F SYST BP LT 130 MM HG: CPT

## 2025-04-11 NOTE — TELEPHONE ENCOUNTER
Action Requested: Summary for Provider     []  Critical Lab, Recommendations Needed  [] Need Additional Advice  [x]   FYI    []   Need Orders  [] Need Medications Sent to Pharmacy  []  Other     SUMMARY: Patient stated that she had a shooting pain from her chin to the right side of her cheek yesterday. Noticed a lump about the size of a nickel or dime under the right side of her chin. Area is tender to touch. No cold symptoms or sore throat. Lymph nodes not swollen in the neck. No fevers. No numbness or tingling or facial drooping. Patient applied an ice pack yesterday to it. No other symptoms. Patient wanted to get it looked at today. Appointment made for today at 2pm with Braxton Lala in [place].   Reason for call: Lump (Lump under right side of chin)  Onset: Apr 10, 2025  Reason for Disposition   Swelling is painful to touch and no fever    Protocols used: Skin Lump or Localized Swelling-A-OH

## 2025-04-11 NOTE — PROGRESS NOTES
Subjective:   Josefa Whelan is a 34 year old female who presents for Lump ( Shooting pain under right side of chin, tender to touch,past 2 days,)   Patient is a pleasant 34-year-old female with no significant past medical history.  Patient presents to office today new to this provider for evaluation of painful lump.  Review of telephone encounter reveals    \"Patient stated that she had a shooting pain from her chin to the right side of her cheek yesterday. Noticed a lump about the size of a nickel or dime under the right side of her chin. Area is tender to touch. No cold symptoms or sore throat. Lymph nodes not swollen in the neck. No fevers. No numbness or tingling or facial drooping. Patient applied an ice pack yesterday to it. No other symptoms.\"    Patient states yesterday she had a muscle pain in her right face into cheek. She had some trouble turning her head as well. She felt some stiffness. NO fever, no chills. Had her teeth cleaned about 2 weeks ago. No sore throat no cough. She did bite the inner part of her mouth a few days ago.         Past Medical History[1]   Past Surgical History[2]     History/Other:    Chief Complaint Reviewed and Verified  Nursing Notes Reviewed and   Verified  Tobacco Reviewed  Allergies Reviewed  Medications Reviewed    Problem List Reviewed  Medical History Reviewed  Surgical History   Reviewed  OB Status Reviewed  Family History Reviewed  Social History   Reviewed         Tobacco:  She has never smoked tobacco.    Current Medications[3]      Review of Systems:  Review of Systems   Constitutional:  Negative for chills and fever.   Respiratory:  Negative for cough and shortness of breath.    Cardiovascular:  Negative for chest pain.   Hematological:  Positive for adenopathy.         Objective:   /71 (BP Location: Right arm, Patient Position: Sitting, Cuff Size: adult)   Pulse 66   Ht 5' (1.524 m)   Wt 145 lb 9.6 oz (66 kg)   LMP 03/29/2025   SpO2 98%    BMI 28.44 kg/m²  Estimated body mass index is 28.44 kg/m² as calculated from the following:    Height as of this encounter: 5' (1.524 m).    Weight as of this encounter: 145 lb 9.6 oz (66 kg).  Physical Exam  Vitals and nursing note reviewed.   Constitutional:       Appearance: Normal appearance.   HENT:      Head: Normocephalic and atraumatic.      Right Ear: Tympanic membrane, ear canal and external ear normal. There is no impacted cerumen.      Left Ear: Tympanic membrane, ear canal and external ear normal. There is no impacted cerumen.      Nose: Nose normal. No congestion or rhinorrhea.      Mouth/Throat:      Mouth: Mucous membranes are moist.      Pharynx: Oropharynx is clear. Posterior oropharyngeal erythema present. No oropharyngeal exudate.   Cardiovascular:      Rate and Rhythm: Normal rate and regular rhythm.      Pulses: Normal pulses.      Heart sounds: Normal heart sounds.   Lymphadenopathy:      Head:      Left side of head: Submandibular adenopathy present.   Skin:     Capillary Refill: Capillary refill takes less than 2 seconds.   Neurological:      Mental Status: She is alert and oriented to person, place, and time.           Assessment & Plan:   1. Submandibular lymphadenopathy (Primary)  2. Biting of oral mucosa  Other orders  -     Amoxicillin-Pot Clavulanate; Take 1 tablet by mouth 2 (two) times daily for 7 days.  Dispense: 14 tablet; Refill: 0    Lymphadenopathy likely 2/2 biting cheek vs dental cleaning  Advised warm compresses  Augmentin bid x7 days  If no resolution at 2 weeks check cbc and US    Patient aware of plan of care. All questions answered to satisfaction of the patient. Patient instructed to call office or reach out via [email or web address] if any issues arise. For urgent issues and/or reviewed red flags please proceed to the urgent care or ER.  Also, inform the nurse practitioner with any new symptoms or medication side effects.        Return for Annual physical.    JU Arrieta,  2025, 11:25 AM          [1]   Past Medical History:   Visual impairment    glasses   [2]   Past Surgical History:  Procedure Laterality Date      10/27/2015    last in    [3]   Current Outpatient Medications   Medication Sig Dispense Refill    amoxicillin clavulanate 875-125 MG Oral Tab Take 1 tablet by mouth 2 (two) times daily for 7 days. 14 tablet 0    diclofenac 50 MG Oral Tab EC Take 1 tablet (50 mg total) by mouth 2 (two) times daily as needed (take it with food). (Patient not taking: Reported on 2025) 30 tablet 0

## 2025-04-28 ENCOUNTER — OFFICE VISIT (OUTPATIENT)
Dept: FAMILY MEDICINE CLINIC | Facility: CLINIC | Age: 35
End: 2025-04-28

## 2025-04-28 ENCOUNTER — NURSE TRIAGE (OUTPATIENT)
Dept: FAMILY MEDICINE CLINIC | Facility: CLINIC | Age: 35
End: 2025-04-28

## 2025-04-28 VITALS
DIASTOLIC BLOOD PRESSURE: 76 MMHG | TEMPERATURE: 98 F | HEART RATE: 64 BPM | BODY MASS INDEX: 28.27 KG/M2 | WEIGHT: 144 LBS | HEIGHT: 60 IN | SYSTOLIC BLOOD PRESSURE: 120 MMHG

## 2025-04-28 DIAGNOSIS — R11.2 NAUSEA AND VOMITING, UNSPECIFIED VOMITING TYPE: ICD-10-CM

## 2025-04-28 DIAGNOSIS — H81.13 BENIGN PAROXYSMAL POSITIONAL VERTIGO DUE TO BILATERAL VESTIBULAR DISORDER: Primary | ICD-10-CM

## 2025-04-28 PROCEDURE — 3074F SYST BP LT 130 MM HG: CPT | Performed by: FAMILY MEDICINE

## 2025-04-28 PROCEDURE — 3008F BODY MASS INDEX DOCD: CPT | Performed by: FAMILY MEDICINE

## 2025-04-28 PROCEDURE — 99214 OFFICE O/P EST MOD 30 MIN: CPT | Performed by: FAMILY MEDICINE

## 2025-04-28 PROCEDURE — 3078F DIAST BP <80 MM HG: CPT | Performed by: FAMILY MEDICINE

## 2025-04-28 RX ORDER — MECLIZINE HCL 12.5 MG 12.5 MG/1
12.5 TABLET ORAL 3 TIMES DAILY PRN
Qty: 30 TABLET | Refills: 0 | Status: SHIPPED | OUTPATIENT
Start: 2025-04-28 | End: 2025-05-08

## 2025-04-28 RX ORDER — ONDANSETRON 8 MG/1
8 TABLET, FILM COATED ORAL EVERY 8 HOURS PRN
Qty: 20 TABLET | Refills: 0 | Status: SHIPPED | OUTPATIENT
Start: 2025-04-28 | End: 2025-05-05

## 2025-04-28 NOTE — PROGRESS NOTES
Patient ID: Josefa Whelan is a 34 year old female.         The following individual(s) verbally consented to be recorded using ambient AI listening technology and understand that they can each withdraw their consent to this listening technology at any point by asking the clinician to turn off or pause the recording:    Patient name: Josefa Whelan  Additional names:       Dana Honeycutt RN   Registered Nurse  Specialty: Registered Nurse     Telephone Encounter     Signed     Encounter Date: 4/28/2025     Signed         Action Requested: Summary for Provider      []  Critical Lab, Recommendations Needed  [] Need Additional Advice  []   FYI    []   Need Orders  [] Need Medications Sent to Pharmacy  []  Other      SUMMARY: states she has bene having very bad vertigo starting this morning. It is better now then it was when she woke up but she feels like keeping her eyes open for too long makes it worse. She has vomited from the dizziness when she keeps her eyes open for too long. Scheduled an appointment.      Reason for call: Dizziness (/)  Onset: this morning     The patient states she is having vertigo, she has had this before and takes meclizine for this but she is out of it now as she has not had it in some time. She said she is feeling very light headed and her vision is very \"shifty\" she said she has been vomiting as well from keeping her eyes open for too long while feeling dizzy. She is not having any chest pain or shortness of breath. She is able to walk and stand. She states it was much worse this morning then it is now. Scheduled an appointment for this morning.                    HPI      History of Present Illness  Josefa Whelan is a 34 year old female who presents with severe vertigo and associated nausea.    She experienced severe vertigo this morning, accompanied by vomiting. The vertigo was most intense upon waking and attempting to get up, causing the room to spin and making her feel  more comfortable with her eyes closed and in an upright position. She has experienced vertigo of this severity a couple of years ago, but not recently.    No hearing loss, tinnitus, recent head trauma, fever, or recent cold symptoms. There is no associated chest pain or difficulty breathing. The vertigo is not typically triggered by quick head movements, although she occasionally experiences vertigo with rapid head turns.    She has previously been on meclizine for vertigo but has not used it recently and does not have any left. She also does not have Zofran for nausea. She describes a 'foggy' feeling in her head, which she associates with the vertigo.    Her recent medical history includes obtaining new eyeglasses with a new prescription, but she does not believe this is related to her current symptoms.    Wt Readings from Last 6 Encounters:   25 145 lb 9.6 oz (66 kg)   24 144 lb (65.3 kg)   23 144 lb (65.3 kg)   23 142 lb (64.4 kg)   23 140 lb (63.5 kg)   01/15/21 139 lb (63 kg)       BMI Readings from Last 6 Encounters:   25 28.44 kg/m²   24 28.12 kg/m²   23 28.12 kg/m²   23 27.73 kg/m²   23 27.34 kg/m²   01/15/21 27.15 kg/m²       BP Readings from Last 6 Encounters:   25 102/71   24 107/72   23 112/78   23 108/72   23 110/79   01/15/21 108/73       Results      Review of Systems  No exertional cardiac chest pain or shortness of breath unless stated in HPI which would take precedence.  See HPI for further review of systems.        Medical History:      Past Medical History:    Visual impairment    glasses       Past Surgical History:   Procedure Laterality Date      10/27/2015    last in           Current Outpatient Medications   Medication Sig Dispense Refill    diclofenac 50 MG Oral Tab EC Take 1 tablet (50 mg total) by mouth 2 (two) times daily as needed (take it with food). (Patient not taking: Reported on  4/11/2025) 30 tablet 0     Allergies:No Known Allergies     Physical Exam:       Physical Exam  Blood pressure 120/76, pulse 64, temperature 97.5 °F (36.4 °C), temperature source Tympanic, height 5' (1.524 m), weight 144 lb (65.3 kg), last menstrual period 04/25/2025, not currently breastfeeding.         Physical Exam   Constitutional: Patient is oriented to person, place, and time. Patient appears well-developed and well-nourished.   HENT:   Head: Normocephalic.   Right Ear: Tympanic membrane and ear canal normal.   Left Ear: Tympanic membrane and ear canal normal.   Nose: No mucosal edema.   Mouth/Throat: Oropharynx is clear and moist and mucous membranes are normal.   Eyes: Conjunctivae and EOM are normal. Pupils are equal, round, and reactive to light.  No nystagmus.  Neck: Normal range of motion. Neck supple. No Brudzinski's sign noted. No thyromegaly present.   Cardiovascular: Normal rate, regular rhythm and  normal heart sounds.    Pulmonary/Chest: Effort normal and breath sounds normal. No respiratory distress.   Lymphadenopathy:     Patient has no cervical adenopathy.   Neurological: Patient is alert and oriented to person, place, and time   Patient has normal strength. Patient displays no tremor. No cranial nerve deficit.   Patient displays a negative Romberg sign   Finger to nose is normal    Coordination and gait normal   Skin: Skin is warm.   Psychiatric: Patient has a normal mood and affect. Patient speech is normal    Vitals reviewed.      Physical Exam    CARDIOVASCULAR: Heart sounds normal. Carotid arteries without bruits.  NEUROLOGICAL: Extraocular movements intact. Patellar reflex 1/4. Upper extremity reflexes 2/4. Finger-to-nose test normal. Negative Romberg test. Neurological exam normal.      Assessment/Plan:        Diagnoses and all orders for this visit:    Benign paroxysmal positional vertigo due to bilateral vestibular disorder  -     meclizine 12.5 MG Oral Tab; Take 1 tablet (12.5 mg  total) by mouth 3 (three) times daily as needed for Dizziness (vertigo). Can make tired for some.  She have to looks quite normal at this time.  I will go ahead and treat her symptomatically with the meclizine and Zofran.  If it gets worse she needs to let her PCP Dr. Mcmanus know.  She understands the plan.  Nausea and vomiting, unspecified vomiting type  -     ondansetron (ZOFRAN) 8 MG tablet; Take 1 tablet (8 mg total) by mouth every 8 (eight) hours as needed for Nausea.        Referrals (if applicable)  No orders of the defined types were placed in this encounter.        Follow up if symptoms persist.  Take medicine (if given) as prescribed.  Approach to treatment discussed and patient/family member understands and agrees to plan.     No follow-ups on file.      Assessment & Plan  Vertigo  Acute onset of severe vertigo upon waking, characterized by a room spinning sensation and difficulty standing. No associated hearing loss or tinnitus. Neurological examination is normal, with no signs of infection or fluid in the ears. Previous episodes of vertigo have occurred, but not as severe as this one. Symptoms have improved since this morning. New eyeglasses are unlikely to be related to vertigo.  - Prescribe Meclizine to be taken up to three times a day as needed for vertigo.  - Advise that Meclizine may cause drowsiness; caution against driving if drowsy.  - Instruct to take Meclizine at bedtime if it causes drowsiness.  - Advise to follow up with Dr. Mcmanus on August 7th for a physical examination.  - Instruct to contact Dr. Mcmanus if symptoms worsen.    Nausea and vomiting  Acute episode of nausea and vomiting associated with vertigo. Vomiting occurred once this morning and has since resolved. No associated fever, recent head trauma, or recent illness. No chest pain or difficulty breathing.  - Prescribe Zofran to be taken three times a day as needed for nausea.    Rohit Norwood,   4/28/2025

## 2025-04-28 NOTE — TELEPHONE ENCOUNTER
Action Requested: Summary for Provider     []  Critical Lab, Recommendations Needed  [] Need Additional Advice  []   FYI    []   Need Orders  [] Need Medications Sent to Pharmacy  []  Other     SUMMARY: states she has bene having very bad vertigo starting this morning. It is better now then it was when she woke up but she feels like keeping her eyes open for too long makes it worse. She has vomited from the dizziness when she keeps her eyes open for too long. Scheduled an appointment.     Reason for call: Dizziness (/)  Onset: this morning    The patient states she is having vertigo, she has had this before and takes meclizine for this but she is out of it now as she has not had it in some time. She said she is feeling very light headed and her vision is very \"shifty\" she said she has been vomiting as well from keeping her eyes open for too long while feeling dizzy. She is not having any chest pain or shortness of breath. She is able to walk and stand. She states it was much worse this morning then it is now. Scheduled an appointment for this morning.     Future Appointments   Date Time Provider Department Center   4/28/2025 10:30 AM Rohit Norwood DO [facility]   8/7/2025  1:15 PM [facility]       Reason for Disposition   Spinning or tilting sensation (vertigo) present now    Protocols used: Dizziness-A-OH

## (undated) NOTE — LETTER
[facility]  Scheduling the Birth of Your Sathya Serna    You are scheduled for a  delivery on Wednesday,20 at 7:30am with Martin Wang.   You should arrive at the [facility] at 5:30am.      Please follow the enclosed antimicr not completed prior to your arrival.  You will also sign a consent for surgery. Your partner will be asked to change into scrubs so that he/she can be with you in the operating room for the birth of your child.   There are no cameras or video cameras all

## (undated) NOTE — LETTER
12/27/19    Ernst Zhao 66      Dear Doc Cecil records indicate that you have outstanding lab work and or testing that was ordered for you and has not yet been completed:  Orders Placed This Encounter

## (undated) NOTE — LETTER
8/11/2020              Josefa Trujillo        [address] [address]       To whom it may concern,      Sajan Case was here at the office with [facility] for a post partum problem visit.          Sincerely,

## (undated) NOTE — LETTER
9/18/2020            To Whom It May Concern:    Tere Pollock was seen in my office today for her postpartum visit. She may return to work without restrictions.       Sincerely,        Jaiden Cornelius, DO  [address] [place] [address] Avenue

## (undated) NOTE — LETTER
09/27/19    Ernst Zhao 66      Dear Kevin Neville records indicate that you have outstanding lab work and or testing that was ordered for you and has not yet been completed:  Orders Placed This Encounter

## (undated) NOTE — LETTER
INSTRUCTIONS FOR PRE-SURGICAL   ANTIMICROBIAL BATH/SHOWER    Your doctor has recommended a pre-surgical CHG (chlorhexidine gluconate) shower/bath with Betasept (also sold as Hibiclens). It reduces bacteria that can potentially cause infection.   Betasept Keep out of reach of children. If swallowed get medica help or contact [employer]. Store between 60-80 degrees F. Fabric Warning! CHG WILL STAIN YOUR FABRICS! Use with care around shower curtains, towels washcloths rugs and clothes.   Wipe

## (undated) NOTE — LETTER
10/2/2017              Misbah Carney         Dear Rod Greenwood,    It was a pleasure to see you.   Your PAP test was normal. We recommend to repeat your pap in 3 years, however you should still continue t

## (undated) NOTE — LETTER
[facility]  Administration of Anesthesia  1. I, Fabien Ram, or _________________________________ acting on her behalf, (Patient) (Dependent/Representative) request to receive anesthesia for my pending procedure/operation/treatment. infections, high spinal block, spinal bleeding, seizure, cardiac arrest and death. 7. AWARENESS: I understand that it is possible (but unlikely) to have explicit memory of events from the operating room while under general anesthesia.   8. ELECTROCONVULSIV unconscious pt /Relationship    My signature below affirms that prior to the time of the procedure, I have explained to the patient and/or his/her guardian, the risks and benefits of undergoing anesthesia, as well as any reasonable alternatives.     _______

## (undated) NOTE — LETTER
[address] [place] [address]      Authorization for Surgical Operation and Procedure     Date:___________                                                                                                         Time:_______ reactions, transmission of diseases such as Hepatitis, AIDS and Cytomegalovirus (CMV) and fluid overload. In the event that I wish to have an autologous transfusion of my own blood, or a directed donor transfusion. I will discuss this with my physician. ends for purposes of reinstating the DNAR order.   10. Patients having a sterilization procedure: I understand that if the procedure is successful the results will be permanent and it will therefore be impossible for me to inseminate, conceive, or bear chil _____________________________  (Signature of Physician)                                                                                         (Date)                                   (Time)

## (undated) NOTE — LETTER
[address]  [address] [place], [place]      Authorization for Surgical Operation and Procedure     Date:___________                                                                                                         Time:_______ reactions, transmission of diseases such as Hepatitis, AIDS and Cytomegalovirus (CMV) and fluid overload. In the event that I wish to have an autologous transfusion of my own blood, or a directed donor transfusion. I will discuss this with my physician. ends for purposes of reinstating the DNAR order.   10. Patients having a sterilization procedure: I understand that if the procedure is successful the results will be permanent and it will therefore be impossible for me to inseminate, conceive, or bear chil _____________________________  (Signature of Physician)                                                                                         (Date)                                   (Time)

## (undated) NOTE — MR AVS SNAPSHOT
After Visit Summary   3/20/2024    Josefa Whelan   MRN: [id number]           Visit Information     Date & Time  3/20/2024 10:40 AM Provider  Janina Emery APRN [facility] - OB/GYN Dept. Phone  [phone]      Your Vitals Were  Most recent update: 3/20/2024 10:44 AM    BP   107/72          Pulse   76          Ht   60\"          Wt   144 lb          LMP   03/10/2024 (Approximate)             BMI   28.12 kg/m²         Allergies as of 3/20/2024  Review status set to Review Complete on 3/20/2024   No Known Allergies     Your Current Medications        Dosage    diclofenac 50 MG Oral Tab EC Take 1 tablet (50 mg total) by mouth 2 (two) times daily as needed (take it with food).      Diagnoses for This Visit    Well woman exam with routine gynecological exam   [643561]  -  Primary  Screening for cervical cancer   [945627]             We Ordered the Following     Normal Orders This Visit    Hpv Dna  High Risk , Thin Prep Collect [[id number] CUSTOM]     THINPREP PAP SMEAR ONLY [[id number] CUSTOM]     ThinPrep PAP Smear [[id number] CUSTOM]     Future Labs/Procedures Expected by Expires    Hpv Dna  High Risk , Thin Prep Collect [[id number] CUSTOM]  3/20/2024 3/20/2025    ThinPrep PAP Smear [[id number] CUSTOM]  3/20/2024 3/20/2025                Did you know that [facility] primary care physicians now offer Video Visits through [employer] for adult patients for a variety of conditions such as allergies, back pain and cold symptoms? Skip the drive and waiting room and online chat with a doctor face-to-face using your web-cam enabled computer or mobile device wherever you are. Video Visits cost $50 and can be paid hassle-free using a credit, debit, or health savings card.  Not active on [employer]? Ask us how to get signed up today!          If you receive a survey from [employer], please take a few minutes to complete it and provide feedback. We strive to deliver the best patient  experience and are looking for ways to make improvements. Your feedback will help us do so. For more information on [employer], please visit [email or web address]           No text in SmartText           No text in SmartText

## (undated) NOTE — LETTER
06/24/19    Ernst Zhao 66      Dear Howard Hillman records indicate that you have outstanding lab work and or testing that was ordered for you and has not yet been completed:  Orders Placed This Encounter

## (undated) NOTE — LETTER
[address]      Authorization for Surgical Operation and Procedure     Date:8/5/20___________                                                                                                         Time:# potential risks that can occur: fever and allergic reactions, hemolytic reactions, transmission of diseases such as Hepatitis, AIDS and Cytomegalovirus (CMV) and fluid overload.   In the event that I wish to have an autologous transfusion of my own blood, o attending physician will determine when the applicable recovery period ends for purposes of reinstating the DNAR order.   10. Patients having a sterilization procedure: I understand that if the procedure is successful the results will be permanent and it wi _______________________________________________________________ _____________________________  Chris Hans of Physician)                                                                                         (Date)                                   (Time)

## (undated) NOTE — LETTER
3/23/2020        Titus Healy        [address]        [facility] [address]      Dear Employer,  Titus Healy is currently under my medical care.  At [facility], we are taking special precautions and doing everything we can to p ? During the social distancing period imposed by the [place] in March, any employee who can be isolated at home and avoid exposure to the virus from colleagues would be at less risk.    As stated above, our physicians are prioritizing patient care

## (undated) NOTE — LETTER
7/24/2020              Josefa Trujillo        [address]        [facility] [address] [place] [address]         To Whom It May Concern,      Kvng Marcos is currently pregnant and under my medical care. Her RACHELE is 8/12/2020. Sincerely,    Arian Chacon.  ROSA